# Patient Record
Sex: FEMALE | Race: WHITE
[De-identification: names, ages, dates, MRNs, and addresses within clinical notes are randomized per-mention and may not be internally consistent; named-entity substitution may affect disease eponyms.]

---

## 2023-08-30 ENCOUNTER — HOSPITAL ENCOUNTER (OUTPATIENT)
Dept: HOSPITAL 75 - RAD | Age: 63
End: 2023-08-30
Attending: FAMILY MEDICINE
Payer: COMMERCIAL

## 2023-08-30 DIAGNOSIS — M25.551: Primary | ICD-10-CM

## 2023-08-30 PROCEDURE — 73502 X-RAY EXAM HIP UNI 2-3 VIEWS: CPT

## 2023-08-31 NOTE — DIAGNOSTIC IMAGING REPORT
Indication: 62-year-old female with right hip pain after being

sedentary for a considerable length of time



Comparisons: None



FINDINGS:



AP and frog-leg lateral view of the right hip show no evidence of

new or healing fractures, bony destruction or remodeling. The

right femoral head appears well-seated within the right

acetabular.



IMPRESSION:



No fracture or subluxation seen.



Dictated by: 



  Dictated on workstation # [id number]